# Patient Record
Sex: MALE | Race: WHITE | NOT HISPANIC OR LATINO | URBAN - METROPOLITAN AREA
[De-identification: names, ages, dates, MRNs, and addresses within clinical notes are randomized per-mention and may not be internally consistent; named-entity substitution may affect disease eponyms.]

---

## 2024-10-02 ENCOUNTER — OFFICE VISIT (OUTPATIENT)
Dept: URBAN - METROPOLITAN AREA CLINIC 48 | Facility: CLINIC | Age: 39
End: 2024-10-02
Payer: COMMERCIAL

## 2024-10-02 ENCOUNTER — DASHBOARD ENCOUNTERS (OUTPATIENT)
Age: 39
End: 2024-10-02

## 2024-10-02 VITALS
OXYGEN SATURATION: 98 % | SYSTOLIC BLOOD PRESSURE: 142 MMHG | DIASTOLIC BLOOD PRESSURE: 80 MMHG | HEIGHT: 72 IN | HEART RATE: 86 BPM | TEMPERATURE: 98.1 F | WEIGHT: 315 LBS | BODY MASS INDEX: 42.66 KG/M2

## 2024-10-02 DIAGNOSIS — K21.9 CHRONIC GERD: ICD-10-CM

## 2024-10-02 DIAGNOSIS — K64.8 EXTERNAL HEMORRHOIDS: ICD-10-CM

## 2024-10-02 DIAGNOSIS — K92.1 HEMATOCHEZIA: ICD-10-CM

## 2024-10-02 PROBLEM — 235595009: Status: ACTIVE | Noted: 2024-10-02

## 2024-10-02 PROBLEM — 271737000: Status: ACTIVE | Noted: 2024-10-02

## 2024-10-02 PROCEDURE — 99204 OFFICE O/P NEW MOD 45 MIN: CPT | Performed by: INTERNAL MEDICINE

## 2024-10-02 RX ORDER — OMEPRAZOLE 20 MG/1
CAPSULE, DELAYED RELEASE ORAL
OUTPATIENT

## 2024-10-02 RX ORDER — FLUOXETINE HYDROCHLORIDE 90 MG/1
1 CAPSULE CAPSULE, DELAYED RELEASE PELLETS ORAL
Status: ACTIVE | COMMUNITY

## 2024-10-02 RX ORDER — SEMAGLUTIDE 0.25 MG/.5ML
INJECTION, SOLUTION SUBCUTANEOUS
Qty: 2 MILLILITER | Status: ON HOLD | COMMUNITY

## 2024-10-02 RX ORDER — HYDROCORTISONE 25 MG/G
1 APPLICATION CREAM TOPICAL TWICE A DAY
Qty: 30 GRAMS | Refills: 1 | OUTPATIENT
Start: 2024-10-02 | End: 2024-10-22

## 2024-10-02 RX ORDER — LISINOPRIL 20 MG/1
TABLET ORAL
Qty: 90 TABLET | Status: ACTIVE | COMMUNITY

## 2024-10-02 RX ORDER — OMEPRAZOLE 20 MG/1
CAPSULE, DELAYED RELEASE ORAL
Qty: 90 CAPSULE | Status: ACTIVE | COMMUNITY

## 2024-10-02 NOTE — PHYSICAL EXAM SKIN:
no rashes , no jaundice present , good turgor , no masses , no tenderness on palpation Medical Necessity Information: LCD Guidelines vary from payer to payer. Please check with your payer's policy to determine medical necessity.

## 2024-10-02 NOTE — PHYSICAL EXAM GASTROINTESTINAL
Abdomen , soft, nontender, exam limited by obesity , no guarding or rigidity , no masses palpable , normal bowel sounds , Liver and Spleen , no hepatomegaly present , no hepatosplenomegaly , liver nontender , spleen not palpable

## 2024-10-02 NOTE — PHYSICAL EXAM CONSTITUTIONAL:
well developed, well nourished , in no acute distress , ambulating without difficulty , normal communication ability; severe obesity

## 2024-10-02 NOTE — HPI-TODAY'S VISIT:
40 y/o male is being referred for evaluation of hematochezia. Labs 5/9/24 showed mild anemia with Hgb of 13.1; renal and hepatic function were normal. He reports hematochezia over the last few months (May have started at some point in May). Bleeding occurs with most bowel movements. It is sometimes bright red, and other times maroon with clots. He has felt hard knots on the outside of the rectum which he believes to be hemorrhoids, which are intermittently painful with throbbing. He has tried preparation H and witch hazel which provide relief. He has noted a change in bowels in the last month. He is still having a bowel movement daily, but stool is often very hard and then followed by loose stool. He has noted mucus in the stool. He has no known family h/o CRC, polyps, or IBD. He has never had a colonoscopy. He has history of GERD which has been controlled on Omeprazole 20 mg daily. He has not had an EGD in 20+ years; denies dysphagia or dyspepsia. He denies NSAID use. He has been seen for evaluation of gastric bypass surgery and was put on Wegovy to get weight down prior to decrease surgery risk. He states he is now unable to get the medication and has been off of it for two weeks. He denies any cardiac history. He does have sleep apnea and is compliant with CPAP.

## 2024-10-08 LAB
HEMATOCRIT: 48.5
HEMOGLOBIN: 14.5
MCH: 28
MCHC: 29.9
MCV: 94
PLATELETS: 322
RBC: 5.18
RDW: 13.8
WBC: 11.3

## 2024-10-09 ENCOUNTER — OFFICE VISIT (OUTPATIENT)
Dept: URBAN - METROPOLITAN AREA MEDICAL CENTER 35 | Facility: MEDICAL CENTER | Age: 39
End: 2024-10-09
Payer: COMMERCIAL

## 2024-10-09 DIAGNOSIS — K62.1 ANAL AND RECTAL POLYP: ICD-10-CM

## 2024-10-09 DIAGNOSIS — K22.89 DILATATION OF ESOPHAGUS: ICD-10-CM

## 2024-10-09 DIAGNOSIS — K62.5 ANAL BLEEDING: ICD-10-CM

## 2024-10-09 DIAGNOSIS — D64.89 ANEMIA DUE TO OTHER CAUSE: ICD-10-CM

## 2024-10-09 DIAGNOSIS — K29.60 ADENOPAPILLOMATOSIS GASTRICA: ICD-10-CM

## 2024-10-09 PROCEDURE — 43239 EGD BIOPSY SINGLE/MULTIPLE: CPT | Performed by: INTERNAL MEDICINE

## 2024-10-09 PROCEDURE — 45380 COLONOSCOPY AND BIOPSY: CPT | Performed by: INTERNAL MEDICINE

## 2024-10-11 ENCOUNTER — TELEPHONE ENCOUNTER (OUTPATIENT)
Dept: URBAN - METROPOLITAN AREA CLINIC 48 | Facility: CLINIC | Age: 39
End: 2024-10-11

## 2024-10-14 ENCOUNTER — TELEPHONE ENCOUNTER (OUTPATIENT)
Dept: URBAN - METROPOLITAN AREA CLINIC 46 | Facility: CLINIC | Age: 39
End: 2024-10-14

## 2024-10-16 ENCOUNTER — TELEPHONE ENCOUNTER (OUTPATIENT)
Dept: URBAN - METROPOLITAN AREA CLINIC 48 | Facility: CLINIC | Age: 39
End: 2024-10-16

## 2024-11-04 ENCOUNTER — TELEPHONE ENCOUNTER (OUTPATIENT)
Dept: URBAN - METROPOLITAN AREA CLINIC 48 | Facility: CLINIC | Age: 39
End: 2024-11-04

## 2024-11-04 VITALS — HEIGHT: 72 IN | WEIGHT: 315 LBS | BODY MASS INDEX: 42.66 KG/M2

## 2024-11-04 RX ORDER — FLUOXETINE HYDROCHLORIDE 90 MG/1
1 CAPSULE CAPSULE, DELAYED RELEASE PELLETS ORAL
Status: ACTIVE | COMMUNITY

## 2024-11-04 RX ORDER — LISINOPRIL 20 MG/1
TABLET ORAL
Qty: 90 TABLET | Status: ACTIVE | COMMUNITY

## 2024-11-04 RX ORDER — OMEPRAZOLE 20 MG/1
CAPSULE, DELAYED RELEASE ORAL
Status: ACTIVE | COMMUNITY

## 2024-11-04 RX ORDER — HYOSCYAMINE SULFATE 0.12 MG/1
1 TABLET NIGHTLY BEFORE BED TABLET ORAL ONCE A DAY
Qty: 30 TABLET | Refills: 3 | OUTPATIENT
Start: 2024-11-04 | End: 2025-03-04

## 2024-11-04 RX ORDER — SEMAGLUTIDE 0.25 MG/.5ML
INJECTION, SOLUTION SUBCUTANEOUS
Qty: 2 MILLILITER | Status: ON HOLD | COMMUNITY